# Patient Record
Sex: FEMALE | Race: WHITE | NOT HISPANIC OR LATINO | URBAN - METROPOLITAN AREA
[De-identification: names, ages, dates, MRNs, and addresses within clinical notes are randomized per-mention and may not be internally consistent; named-entity substitution may affect disease eponyms.]

---

## 2018-09-09 ENCOUNTER — INPATIENT (INPATIENT)
Facility: HOSPITAL | Age: 63
LOS: 1 days | Discharge: HOME | DRG: 881 | End: 2018-09-11
Attending: HOSPITALIST | Admitting: FAMILY MEDICINE
Payer: MEDICARE

## 2018-09-09 VITALS
RESPIRATION RATE: 16 BRPM | HEIGHT: 67 IN | SYSTOLIC BLOOD PRESSURE: 142 MMHG | WEIGHT: 169.98 LBS | HEART RATE: 60 BPM | OXYGEN SATURATION: 99 % | TEMPERATURE: 98 F | DIASTOLIC BLOOD PRESSURE: 78 MMHG

## 2018-09-09 DIAGNOSIS — Z29.9 ENCOUNTER FOR PROPHYLACTIC MEASURES, UNSPECIFIED: ICD-10-CM

## 2018-09-09 DIAGNOSIS — E78.5 HYPERLIPIDEMIA, UNSPECIFIED: ICD-10-CM

## 2018-09-09 DIAGNOSIS — F32.9 MAJOR DEPRESSIVE DISORDER, SINGLE EPISODE, UNSPECIFIED: ICD-10-CM

## 2018-09-09 DIAGNOSIS — I10 ESSENTIAL (PRIMARY) HYPERTENSION: ICD-10-CM

## 2018-09-09 DIAGNOSIS — R41.3 OTHER AMNESIA: ICD-10-CM

## 2018-09-09 DIAGNOSIS — Z95.5 PRESENCE OF CORONARY ANGIOPLASTY IMPLANT AND GRAFT: Chronic | ICD-10-CM

## 2018-09-09 DIAGNOSIS — F41.9 ANXIETY DISORDER, UNSPECIFIED: ICD-10-CM

## 2018-09-09 DIAGNOSIS — I21.9 ACUTE MYOCARDIAL INFARCTION, UNSPECIFIED: ICD-10-CM

## 2018-09-09 LAB
ALBUMIN SERPL ELPH-MCNC: 3.1 G/DL — LOW (ref 3.3–5)
ALP SERPL-CCNC: 89 U/L — SIGNIFICANT CHANGE UP (ref 40–120)
ALT FLD-CCNC: 21 U/L — SIGNIFICANT CHANGE UP (ref 12–78)
ANION GAP SERPL CALC-SCNC: 9 MMOL/L — SIGNIFICANT CHANGE UP (ref 5–17)
AST SERPL-CCNC: 19 U/L — SIGNIFICANT CHANGE UP (ref 15–37)
BASOPHILS # BLD AUTO: 0.03 K/UL — SIGNIFICANT CHANGE UP (ref 0–0.2)
BASOPHILS NFR BLD AUTO: 0.5 % — SIGNIFICANT CHANGE UP (ref 0–2)
BILIRUB SERPL-MCNC: 0.6 MG/DL — SIGNIFICANT CHANGE UP (ref 0.2–1.2)
BUN SERPL-MCNC: 13 MG/DL — SIGNIFICANT CHANGE UP (ref 7–23)
CALCIUM SERPL-MCNC: 8.4 MG/DL — LOW (ref 8.5–10.1)
CHLORIDE SERPL-SCNC: 106 MMOL/L — SIGNIFICANT CHANGE UP (ref 96–108)
CK MB BLD-MCNC: <1.5 % — SIGNIFICANT CHANGE UP (ref 0–3.5)
CK MB CFR SERPL CALC: <1 NG/ML — SIGNIFICANT CHANGE UP (ref 0–3.6)
CK SERPL-CCNC: 68 U/L — SIGNIFICANT CHANGE UP (ref 26–192)
CO2 SERPL-SCNC: 27 MMOL/L — SIGNIFICANT CHANGE UP (ref 22–31)
CREAT SERPL-MCNC: 0.79 MG/DL — SIGNIFICANT CHANGE UP (ref 0.5–1.3)
D DIMER BLD IA.RAPID-MCNC: <150 NG/ML DDU — SIGNIFICANT CHANGE UP
EOSINOPHIL # BLD AUTO: 0.08 K/UL — SIGNIFICANT CHANGE UP (ref 0–0.5)
EOSINOPHIL NFR BLD AUTO: 1.2 % — SIGNIFICANT CHANGE UP (ref 0–6)
GLUCOSE SERPL-MCNC: 95 MG/DL — SIGNIFICANT CHANGE UP (ref 70–99)
HCT VFR BLD CALC: 35.2 % — SIGNIFICANT CHANGE UP (ref 34.5–45)
HGB BLD-MCNC: 11.7 G/DL — SIGNIFICANT CHANGE UP (ref 11.5–15.5)
IMM GRANULOCYTES NFR BLD AUTO: 0.3 % — SIGNIFICANT CHANGE UP (ref 0–1.5)
INR BLD: 1.04 RATIO — SIGNIFICANT CHANGE UP (ref 0.88–1.16)
LYMPHOCYTES # BLD AUTO: 1.86 K/UL — SIGNIFICANT CHANGE UP (ref 1–3.3)
LYMPHOCYTES # BLD AUTO: 29 % — SIGNIFICANT CHANGE UP (ref 13–44)
MCHC RBC-ENTMCNC: 30.5 PG — SIGNIFICANT CHANGE UP (ref 27–34)
MCHC RBC-ENTMCNC: 33.2 GM/DL — SIGNIFICANT CHANGE UP (ref 32–36)
MCV RBC AUTO: 91.9 FL — SIGNIFICANT CHANGE UP (ref 80–100)
MONOCYTES # BLD AUTO: 0.41 K/UL — SIGNIFICANT CHANGE UP (ref 0–0.9)
MONOCYTES NFR BLD AUTO: 6.4 % — SIGNIFICANT CHANGE UP (ref 2–14)
NEUTROPHILS # BLD AUTO: 4.01 K/UL — SIGNIFICANT CHANGE UP (ref 1.8–7.4)
NEUTROPHILS NFR BLD AUTO: 62.6 % — SIGNIFICANT CHANGE UP (ref 43–77)
NRBC # BLD: 0 /100 WBCS — SIGNIFICANT CHANGE UP (ref 0–0)
PLATELET # BLD AUTO: 285 K/UL — SIGNIFICANT CHANGE UP (ref 150–400)
POTASSIUM SERPL-MCNC: 4 MMOL/L — SIGNIFICANT CHANGE UP (ref 3.5–5.3)
POTASSIUM SERPL-SCNC: 4 MMOL/L — SIGNIFICANT CHANGE UP (ref 3.5–5.3)
PROT SERPL-MCNC: 6.4 G/DL — SIGNIFICANT CHANGE UP (ref 6–8.3)
PROTHROM AB SERPL-ACNC: 11.4 SEC — SIGNIFICANT CHANGE UP (ref 9.8–12.7)
RBC # BLD: 3.83 M/UL — SIGNIFICANT CHANGE UP (ref 3.8–5.2)
RBC # FLD: 13.2 % — SIGNIFICANT CHANGE UP (ref 10.3–14.5)
SODIUM SERPL-SCNC: 142 MMOL/L — SIGNIFICANT CHANGE UP (ref 135–145)
TROPONIN I SERPL-MCNC: <.015 NG/ML — SIGNIFICANT CHANGE UP (ref 0.01–0.04)
TSH SERPL-MCNC: 2.12 UIU/ML — SIGNIFICANT CHANGE UP (ref 0.36–3.74)
WBC # BLD: 6.41 K/UL — SIGNIFICANT CHANGE UP (ref 3.8–10.5)
WBC # FLD AUTO: 6.41 K/UL — SIGNIFICANT CHANGE UP (ref 3.8–10.5)

## 2018-09-09 PROCEDURE — 93010 ELECTROCARDIOGRAM REPORT: CPT

## 2018-09-09 PROCEDURE — 99223 1ST HOSP IP/OBS HIGH 75: CPT | Mod: AI,GC

## 2018-09-09 PROCEDURE — 70450 CT HEAD/BRAIN W/O DYE: CPT | Mod: 26

## 2018-09-09 PROCEDURE — 71045 X-RAY EXAM CHEST 1 VIEW: CPT | Mod: 26

## 2018-09-09 PROCEDURE — 99285 EMERGENCY DEPT VISIT HI MDM: CPT

## 2018-09-09 RX ORDER — INFLUENZA VIRUS VACCINE 15; 15; 15; 15 UG/.5ML; UG/.5ML; UG/.5ML; UG/.5ML
0.5 SUSPENSION INTRAMUSCULAR ONCE
Qty: 0 | Refills: 0 | Status: DISCONTINUED | OUTPATIENT
Start: 2018-09-09 | End: 2018-09-11

## 2018-09-09 RX ORDER — PANTOPRAZOLE SODIUM 20 MG/1
40 TABLET, DELAYED RELEASE ORAL
Qty: 0 | Refills: 0 | Status: DISCONTINUED | OUTPATIENT
Start: 2018-09-09 | End: 2018-09-11

## 2018-09-09 RX ORDER — CLOPIDOGREL BISULFATE 75 MG/1
75 TABLET, FILM COATED ORAL DAILY
Qty: 0 | Refills: 0 | Status: DISCONTINUED | OUTPATIENT
Start: 2018-09-09 | End: 2018-09-11

## 2018-09-09 RX ORDER — FLUOXETINE HCL 10 MG
1 CAPSULE ORAL
Qty: 0 | Refills: 0 | COMMUNITY

## 2018-09-09 RX ORDER — ATENOLOL 25 MG/1
50 TABLET ORAL DAILY
Qty: 0 | Refills: 0 | Status: DISCONTINUED | OUTPATIENT
Start: 2018-09-09 | End: 2018-09-11

## 2018-09-09 RX ORDER — ASPIRIN/CALCIUM CARB/MAGNESIUM 324 MG
81 TABLET ORAL DAILY
Qty: 0 | Refills: 0 | Status: DISCONTINUED | OUTPATIENT
Start: 2018-09-09 | End: 2018-09-11

## 2018-09-09 RX ORDER — ATORVASTATIN CALCIUM 80 MG/1
1 TABLET, FILM COATED ORAL
Qty: 0 | Refills: 0 | COMMUNITY

## 2018-09-09 RX ORDER — ATORVASTATIN CALCIUM 80 MG/1
20 TABLET, FILM COATED ORAL AT BEDTIME
Qty: 0 | Refills: 0 | Status: DISCONTINUED | OUTPATIENT
Start: 2018-09-09 | End: 2018-09-11

## 2018-09-09 RX ORDER — FLUOXETINE HCL 10 MG
10 CAPSULE ORAL DAILY
Qty: 0 | Refills: 0 | Status: DISCONTINUED | OUTPATIENT
Start: 2018-09-09 | End: 2018-09-11

## 2018-09-09 RX ORDER — CLOPIDOGREL BISULFATE 75 MG/1
1 TABLET, FILM COATED ORAL
Qty: 0 | Refills: 0 | COMMUNITY

## 2018-09-09 RX ORDER — CLOPIDOGREL BISULFATE 75 MG/1
0 TABLET, FILM COATED ORAL
Qty: 0 | Refills: 0 | COMMUNITY

## 2018-09-09 RX ORDER — ASPIRIN/CALCIUM CARB/MAGNESIUM 324 MG
1 TABLET ORAL
Qty: 0 | Refills: 0 | COMMUNITY

## 2018-09-09 RX ORDER — ENOXAPARIN SODIUM 100 MG/ML
40 INJECTION SUBCUTANEOUS EVERY 24 HOURS
Qty: 0 | Refills: 0 | Status: DISCONTINUED | OUTPATIENT
Start: 2018-09-09 | End: 2018-09-11

## 2018-09-09 RX ORDER — PANTOPRAZOLE SODIUM 20 MG/1
1 TABLET, DELAYED RELEASE ORAL
Qty: 0 | Refills: 0 | COMMUNITY

## 2018-09-09 RX ORDER — ATENOLOL 25 MG/1
1 TABLET ORAL
Qty: 0 | Refills: 0 | COMMUNITY

## 2018-09-09 RX ORDER — LISINOPRIL 2.5 MG/1
20 TABLET ORAL DAILY
Qty: 0 | Refills: 0 | Status: DISCONTINUED | OUTPATIENT
Start: 2018-09-09 | End: 2018-09-11

## 2018-09-09 RX ORDER — LISINOPRIL 2.5 MG/1
1 TABLET ORAL
Qty: 0 | Refills: 0 | COMMUNITY

## 2018-09-09 RX ADMIN — ENOXAPARIN SODIUM 40 MILLIGRAM(S): 100 INJECTION SUBCUTANEOUS at 17:28

## 2018-09-09 RX ADMIN — ATORVASTATIN CALCIUM 20 MILLIGRAM(S): 80 TABLET, FILM COATED ORAL at 21:02

## 2018-09-09 NOTE — H&P ADULT - ASSESSMENT
64yo F with PMH of HTN, HLD, Depression, Anxiety, MI (s/p stents 2015 on dual antiplatelet therapy) presents to the ED with confusion admitted for evaluation of confusion and memory loss

## 2018-09-09 NOTE — H&P ADULT - HISTORY OF PRESENT ILLNESS
64yo F with PMH of HTN, HLD, Depression, Anxiety, MI (s/p stents 2015 on dual antiplatelet therapy) presents to the ED with confusion. Pt arrived to [place] for her daughters wedding 2 days prior and since then as per  she has been forgetful. Pt has been forgetting places, past events, confusing people name and asking to go "home" every day. At times pt has a blank stare in her eyes and is unable to carry out conversation. As per  pt has been increasingly depressed with decrease interest and appetite as well as decreased showering habits. Pt has been more depressed since  started a new job 2 months ago. Pt started prozac 1 month and 2 days ago, family is concerned about side effects that could be causing these symptoms. Of note pt has history of Alzheimer disease with early onset in her mother at age 50. Pt denies weakness, N/V/D, abd pain, weakness, numbness/tingling.     In the ED, /78, HR 60, RR 16, Temp 97.8, 99% on RA. Labs significant for Calcium 8.4, alb 3.1, CT head negative, CXR negative. EKG shows NSR HR 57

## 2018-09-09 NOTE — CONSULT NOTE ADULT - ASSESSMENT
forgetfulness with blank stares -- possible pseudodementia secondary to depression   check eeg  mri   check labs   continue home meds

## 2018-09-09 NOTE — H&P ADULT - NSHPREVIEWOFSYSTEMS_GEN_ALL_CORE
Constitutional: denies fever, chills, diaphoresis   HEENT: denies blurry vision, difficulty hearing  Respiratory: denies SOB, ALMAZAN, cough, sputum production, wheezing, hemoptysis  Cardiovascular: denies CP, palpitations, edema  Gastrointestinal: denies nausea, vomiting, diarrhea, constipation, abdominal pain, melena, hematochezia   Genitourinary: denies dysuria, frequency, urgency, hematuria   Skin/Breast: denies rash, itching  Musculoskeletal: denies myalgias, joint swelling, muscle weakness  Neurologic: denies headache, weakness, dizziness, paresthesias, numbness/tingling  Psychiatric: admits to anxiety and depression denies suicidal, homicidal thoughts

## 2018-09-09 NOTE — H&P ADULT - ATTENDING COMMENTS
Given acuity of symptoms I am suspicious patient is experiencing worsening depression, rather than dementia. Will monitor and get psych eval.

## 2018-09-09 NOTE — ED ADULT TRIAGE NOTE - CHIEF COMPLAINT QUOTE
Pt brought to ED by family for increased memory loss and moments of ams, family reports this has been going on for more than 1 week and has gotten worse

## 2018-09-09 NOTE — ED ADULT NURSE REASSESSMENT NOTE - NS ED NURSE REASSESS COMMENT FT1
patient seen by neuro admitted to medicine awaiting bed pt status unchanged family at bedside pt pleasantly confused easily reoriented

## 2018-09-09 NOTE — H&P ADULT - PROBLEM SELECTOR PLAN 3
continue prozac  consider Psych consult continue prozac  consider Psych consult as memory loss can be attributed to depression

## 2018-09-09 NOTE — H&P ADULT - NSHPSOCIALHISTORY_GEN_ALL_CORE
Pt lives at home with   rarely drinks etoh  former smoker 1ppd for 40 years, now smokes 2-3 cigs a day  able to carry out ADLs  ambulates independently

## 2018-09-09 NOTE — H&P ADULT - NSHPPHYSICALEXAM_GEN_ALL_CORE
T(C): 36.6 (09-09-18 @ 12:02), Max: 36.6 (09-09-18 @ 12:02)  HR: 60 (09-09-18 @ 12:02) (60 - 60)  BP: 142/78 (09-09-18 @ 12:02) (142/78 - 142/78)  RR: 16 (09-09-18 @ 12:02) (16 - 16)  SpO2: 99% (09-09-18 @ 12:02) (99% - 99%)  Wt(kg): --    Physical Exam:  General: NAD  HEENT: normocephalic, atraumatic, PERRLA, extraocular muscles intact bilaterally, moist mucous membranes   Neck: Supple, nontender, no mass  Neurology: A&Ox3, moves all extremities x4, nonfocal, CN II-XII grossly intact, sensation intact  Respiratory: clear to auscultation B/L, No wheezes, rales, ronchi  CV: RRR, +S1/S2, no murmurs, rubs or gallops  Abdominal: Soft, nontender, nondistended +BSx4  Extremities: No cyanosis/clubbing/edema, + peripheral pulses  MSK: Normal ROM, no joint erythema or warmth, no joint swelling   Skin: warm, dry, normal color, no rash or abnormal lesions T(C): 36.6 (09-09-18 @ 12:02), Max: 36.6 (09-09-18 @ 12:02)  HR: 60 (09-09-18 @ 12:02) (60 - 60)  BP: 142/78 (09-09-18 @ 12:02) (142/78 - 142/78)  RR: 16 (09-09-18 @ 12:02) (16 - 16)  SpO2: 99% (09-09-18 @ 12:02) (99% - 99%)  Wt(kg): --    Physical Exam:  General: NAD  HEENT: normocephalic, atraumatic, PERRLA, extraocular muscles intact bilaterally, moist mucous membranes   Neck: Supple, nontender, no mass  Neurology: A&Ox3, moves all extremities x4, nonfocal, CN II-XII grossly intact, sensation intact  Respiratory: clear to auscultation B/L, No wheezes, rales, rhonchi  CV: RRR, +S1/S2, no murmurs, rubs or gallops  Abdominal: Soft, nontender, nondistended +BSx4  Extremities: No cyanosis/clubbing/edema, + peripheral pulses  MSK: Normal ROM, no joint erythema or warmth, no joint swelling   Skin: warm, dry, normal color, no rash or abnormal lesions

## 2018-09-09 NOTE — ED PROVIDER NOTE - OBJECTIVE STATEMENT
63 female presents to ER with  and brother, arrived from [place] yesterday for daughter's wedding.  states for the past 2 days patient has been forgetful regarding places and past events, confusing people's names, at times has a "blank look in her eyes", cannot carry a meaninful conversation. Patient states she feels tired, denies chest pain, no fever, no vomiting.

## 2018-09-09 NOTE — PATIENT PROFILE ADULT. - VISION (WITH CORRECTIVE LENSES IF THE PATIENT USUALLY WEARS THEM):
Partially impaired: cannot see medication labels or newsprint, but can see obstacles in path, and the surrounding layout; can count fingers at arm's length/wears glasses  2 paira

## 2018-09-09 NOTE — H&P ADULT - PROBLEM SELECTOR PLAN 1
Admit to [facility]  Confusion and memory impairment with acute onset over past 2 days  Recent stressor 2 months ago, increasingly depressed with decreased interest  CT head negative  evaluate for TIA vs CVA, will order MRI  carotid dopplers  lipid panel  TFTs  Neuro consulted  EEG Admit to [facility]  Confusion and memory impairment with acute onset over past 2 days  Recent stressor 2 months ago, increasingly depressed with decreased interest  CT head negative  evaluate for TIA vs CVA, will order MRI  lipid panel  TFTs  Neuro consulted  EEG

## 2018-09-09 NOTE — ED ADULT NURSE NOTE - CHPI ED NUR SYMPTOMS NEG
no dizziness/no vomiting/no fever/no blurred vision/no change in level of consciousness/no weakness/no nausea/no numbness/no loss of consciousness

## 2018-09-09 NOTE — ED ADULT NURSE NOTE - OBJECTIVE STATEMENT
patient comes to ED brought by  pt is visiting from [place] and according to pt  and family she has been losing her memory forgetting things worsening over past month or two patient is awake alert oriented to person place and situation denies head trauma denies headache patient is pleasantly confused and forgetful pt moves all extremities well ambulates without difficulty has also had a loss of appetite

## 2018-09-09 NOTE — PROVIDER CONTACT NOTE (OTHER) - SITUATION
Patient complaining of pain on her RLE, knee down.Per patient something new for her. No other complaints voiced.

## 2018-09-10 DIAGNOSIS — F43.21 ADJUSTMENT DISORDER WITH DEPRESSED MOOD: ICD-10-CM

## 2018-09-10 LAB
ALBUMIN SERPL ELPH-MCNC: 2.8 G/DL — LOW (ref 3.3–5)
ALP SERPL-CCNC: 76 U/L — SIGNIFICANT CHANGE UP (ref 40–120)
ALT FLD-CCNC: 17 U/L — SIGNIFICANT CHANGE UP (ref 12–78)
ANION GAP SERPL CALC-SCNC: 9 MMOL/L — SIGNIFICANT CHANGE UP (ref 5–17)
APPEARANCE UR: CLEAR — SIGNIFICANT CHANGE UP
AST SERPL-CCNC: 16 U/L — SIGNIFICANT CHANGE UP (ref 15–37)
BASOPHILS # BLD AUTO: 0.04 K/UL — SIGNIFICANT CHANGE UP (ref 0–0.2)
BASOPHILS NFR BLD AUTO: 0.7 % — SIGNIFICANT CHANGE UP (ref 0–2)
BILIRUB SERPL-MCNC: 0.8 MG/DL — SIGNIFICANT CHANGE UP (ref 0.2–1.2)
BILIRUB UR-MCNC: NEGATIVE — SIGNIFICANT CHANGE UP
BUN SERPL-MCNC: 11 MG/DL — SIGNIFICANT CHANGE UP (ref 7–23)
CALCIUM SERPL-MCNC: 8.6 MG/DL — SIGNIFICANT CHANGE UP (ref 8.5–10.1)
CHLORIDE SERPL-SCNC: 108 MMOL/L — SIGNIFICANT CHANGE UP (ref 96–108)
CHOLEST SERPL-MCNC: 151 MG/DL — SIGNIFICANT CHANGE UP (ref 10–199)
CO2 SERPL-SCNC: 26 MMOL/L — SIGNIFICANT CHANGE UP (ref 22–31)
COLOR SPEC: YELLOW — SIGNIFICANT CHANGE UP
CREAT SERPL-MCNC: 0.81 MG/DL — SIGNIFICANT CHANGE UP (ref 0.5–1.3)
DIFF PNL FLD: NEGATIVE — SIGNIFICANT CHANGE UP
EOSINOPHIL # BLD AUTO: 0.13 K/UL — SIGNIFICANT CHANGE UP (ref 0–0.5)
EOSINOPHIL NFR BLD AUTO: 2.2 % — SIGNIFICANT CHANGE UP (ref 0–6)
FOLATE SERPL-MCNC: 7 NG/ML — SIGNIFICANT CHANGE UP
GLUCOSE SERPL-MCNC: 88 MG/DL — SIGNIFICANT CHANGE UP (ref 70–99)
GLUCOSE UR QL: NEGATIVE — SIGNIFICANT CHANGE UP
HCT VFR BLD CALC: 32.7 % — LOW (ref 34.5–45)
HDLC SERPL-MCNC: 47 MG/DL — LOW
HGB BLD-MCNC: 11.1 G/DL — LOW (ref 11.5–15.5)
IMM GRANULOCYTES NFR BLD AUTO: 0.3 % — SIGNIFICANT CHANGE UP (ref 0–1.5)
KETONES UR-MCNC: NEGATIVE — SIGNIFICANT CHANGE UP
LEUKOCYTE ESTERASE UR-ACNC: NEGATIVE — SIGNIFICANT CHANGE UP
LIPID PNL WITH DIRECT LDL SERPL: 82 MG/DL — SIGNIFICANT CHANGE UP
LYMPHOCYTES # BLD AUTO: 1.92 K/UL — SIGNIFICANT CHANGE UP (ref 1–3.3)
LYMPHOCYTES # BLD AUTO: 31.8 % — SIGNIFICANT CHANGE UP (ref 13–44)
MCHC RBC-ENTMCNC: 30.7 PG — SIGNIFICANT CHANGE UP (ref 27–34)
MCHC RBC-ENTMCNC: 33.9 GM/DL — SIGNIFICANT CHANGE UP (ref 32–36)
MCV RBC AUTO: 90.3 FL — SIGNIFICANT CHANGE UP (ref 80–100)
MONOCYTES # BLD AUTO: 0.39 K/UL — SIGNIFICANT CHANGE UP (ref 0–0.9)
MONOCYTES NFR BLD AUTO: 6.5 % — SIGNIFICANT CHANGE UP (ref 2–14)
NEUTROPHILS # BLD AUTO: 3.53 K/UL — SIGNIFICANT CHANGE UP (ref 1.8–7.4)
NEUTROPHILS NFR BLD AUTO: 58.5 % — SIGNIFICANT CHANGE UP (ref 43–77)
NITRITE UR-MCNC: NEGATIVE — SIGNIFICANT CHANGE UP
PH UR: 5 — SIGNIFICANT CHANGE UP (ref 5–8)
PLATELET # BLD AUTO: 278 K/UL — SIGNIFICANT CHANGE UP (ref 150–400)
POTASSIUM SERPL-MCNC: 4 MMOL/L — SIGNIFICANT CHANGE UP (ref 3.5–5.3)
POTASSIUM SERPL-SCNC: 4 MMOL/L — SIGNIFICANT CHANGE UP (ref 3.5–5.3)
PROT SERPL-MCNC: 5.9 G/DL — LOW (ref 6–8.3)
PROT UR-MCNC: NEGATIVE — SIGNIFICANT CHANGE UP
RBC # BLD: 3.62 M/UL — LOW (ref 3.8–5.2)
RBC # FLD: 12.8 % — SIGNIFICANT CHANGE UP (ref 10.3–14.5)
SODIUM SERPL-SCNC: 143 MMOL/L — SIGNIFICANT CHANGE UP (ref 135–145)
SP GR SPEC: 1.01 — SIGNIFICANT CHANGE UP (ref 1.01–1.02)
T3 SERPL-MCNC: 128 NG/DL — SIGNIFICANT CHANGE UP (ref 80–200)
T4 AB SER-ACNC: 5.5 UG/DL — SIGNIFICANT CHANGE UP (ref 4.6–12)
TOTAL CHOLESTEROL/HDL RATIO MEASUREMENT: 3.2 RATIO — LOW (ref 3.3–7.1)
TRIGL SERPL-MCNC: 110 MG/DL — SIGNIFICANT CHANGE UP (ref 10–149)
UROBILINOGEN FLD QL: NEGATIVE — SIGNIFICANT CHANGE UP
VIT B12 SERPL-MCNC: 270 PG/ML — SIGNIFICANT CHANGE UP (ref 232–1245)
WBC # BLD: 6.03 K/UL — SIGNIFICANT CHANGE UP (ref 3.8–10.5)
WBC # FLD AUTO: 6.03 K/UL — SIGNIFICANT CHANGE UP (ref 3.8–10.5)

## 2018-09-10 PROCEDURE — 70553 MRI BRAIN STEM W/O & W/DYE: CPT | Mod: 26

## 2018-09-10 PROCEDURE — 99233 SBSQ HOSP IP/OBS HIGH 50: CPT

## 2018-09-10 PROCEDURE — 90792 PSYCH DIAG EVAL W/MED SRVCS: CPT

## 2018-09-10 RX ORDER — MIRTAZAPINE 45 MG/1
7.5 TABLET, ORALLY DISINTEGRATING ORAL AT BEDTIME
Qty: 0 | Refills: 0 | Status: DISCONTINUED | OUTPATIENT
Start: 2018-09-10 | End: 2018-09-11

## 2018-09-10 RX ADMIN — ENOXAPARIN SODIUM 40 MILLIGRAM(S): 100 INJECTION SUBCUTANEOUS at 17:23

## 2018-09-10 RX ADMIN — Medication 10 MILLIGRAM(S): at 11:16

## 2018-09-10 RX ADMIN — ATORVASTATIN CALCIUM 20 MILLIGRAM(S): 80 TABLET, FILM COATED ORAL at 22:16

## 2018-09-10 RX ADMIN — MIRTAZAPINE 7.5 MILLIGRAM(S): 45 TABLET, ORALLY DISINTEGRATING ORAL at 22:16

## 2018-09-10 RX ADMIN — CLOPIDOGREL BISULFATE 75 MILLIGRAM(S): 75 TABLET, FILM COATED ORAL at 11:16

## 2018-09-10 RX ADMIN — LISINOPRIL 20 MILLIGRAM(S): 2.5 TABLET ORAL at 05:20

## 2018-09-10 RX ADMIN — PANTOPRAZOLE SODIUM 40 MILLIGRAM(S): 20 TABLET, DELAYED RELEASE ORAL at 05:20

## 2018-09-10 RX ADMIN — ATENOLOL 50 MILLIGRAM(S): 25 TABLET ORAL at 05:20

## 2018-09-10 RX ADMIN — Medication 81 MILLIGRAM(S): at 11:16

## 2018-09-10 NOTE — CHART NOTE - NSCHARTNOTEFT_GEN_A_CORE
Called by RN for Pt c/o right leg pain.  Patient seen and examined at bedside. Currently patient does not feel the pain but wanted make sure this was included in her ROS. Patient states she had right lower extremity pain for past 4 months, Intermittently feels like stabbing pain and when occurs prevents patient from walking more than 1 block. Worsens at night and lasts for 30 minutes. Rest allows the pain to subside when walking. . Denies trauma, rashes, fevers, chills, CP, SOB, palpitations, N/V.        T(C): 36.6 (09-09-18 @ 19:48), Max: 36.6 (09-09-18 @ 12:02)  HR: 63 (09-09-18 @ 19:48) (60 - 65)  BP: 124/69 (09-09-18 @ 19:48) (124/69 - 142/78)  RR: 16 (09-09-18 @ 19:48) (15 - 16)  SpO2: 97% (09-09-18 @ 19:48) (96% - 99%)  Wt(kg): --    Physical :  Gen- NAD, ncat  Ext- bilaterally no edema, no cyanosis, no erythema, no rash, no calf tenderness, Mariluz's sign neg, 2+ pulses b/l  Neuro-  strength 5/5 b/l extrem, ROM intact      Assessment/Plan  63yFemale admitted for   1. Chronic RLE intermittent pain likely claudication with patient's smoking  and history. No further workup at this time. Follow up with primary care.

## 2018-09-10 NOTE — DISCHARGE NOTE ADULT - MEDICATION SUMMARY - MEDICATIONS TO STOP TAKING
I will STOP taking the medications listed below when I get home from the hospital:    Norco 5 mg-325 mg oral tablet  -- 1 tab(s) by mouth 3 times a day, As Needed

## 2018-09-10 NOTE — PROGRESS NOTE ADULT - ATTENDING COMMENTS
F/u MRI, EEG  Psych Dr. Crooks called   PT evaluation F/u MRI, EEG  Psych Dr. Crooks called   PT evaluation  D/w Patient

## 2018-09-10 NOTE — DISCHARGE NOTE ADULT - VISION (WITH CORRECTIVE LENSES IF THE PATIENT USUALLY WEARS THEM):
wears glasses  2 paira/Partially impaired: cannot see medication labels or newsprint, but can see obstacles in path, and the surrounding layout; can count fingers at arm's length

## 2018-09-10 NOTE — PROGRESS NOTE ADULT - PROBLEM SELECTOR PLAN 1
Confusion and memory impairment with acute onset over past 2 days  Recent stressor 2 months ago, increasingly depressed with decreased interest  CT head negative  evaluate for TIA vs CVA, will order MRI  lipid panel  TFTs  Neuro consulted  EEG

## 2018-09-10 NOTE — DISCHARGE NOTE ADULT - HOSPITAL COURSE
62yo F with PMH of HTN, HLD, Depression, Anxiety, MI (s/p stents 2015 on dual antiplatelet therapy) presents to the ED with confusion admitted for evaluation of confusion and memory loss suspect secondary to pseudodementia, depression. CVA ruled out. Patient was seen by neuro, Psych. Work up negative 64yo F with PMH of HTN, HLD, Depression, Anxiety, MI (s/p stents 2015 on dual antiplatelet therapy) presents to the ED with confusion admitted for evaluation of confusion and memory loss suspect secondary to pseudodementia, depression. CVA ruled out. Patient was seen by neuro, Psych. Work up negative  including CT head, MRI with and without contrast, , EEG. Patient cleared for discharge and out patient f/u with neurologist ans Psych. Patient lives in [place] and will go back. Plan d/w , brother.

## 2018-09-10 NOTE — DISCHARGE NOTE ADULT - CARE PLAN
Principal Discharge DX:	Memory loss or impairment  Goal:	Prevent recurrence  Assessment and plan of treatment:	Suspect secondary to pseudodementia and depression   Back to baseline mental status  Please f/u with a Neurologist and Psych when you go back to [place]  Secondary Diagnosis:	Depression  Assessment and plan of treatment:	Continue Prozac  Added Remeron by Dr. Crooks , will send prescription to pharmacy  Secondary Diagnosis:	HLD (hyperlipidemia)  Assessment and plan of treatment:	Home Med  f/u with PCP  Secondary Diagnosis:	HTN (hypertension)  Assessment and plan of treatment:	Home Med  F/u with PCP  Secondary Diagnosis:	CAD (coronary artery disease)  Assessment and plan of treatment:	Continue home meds  F/u with your cardiologist

## 2018-09-10 NOTE — DISCHARGE NOTE ADULT - PATIENT PORTAL LINK FT
You can access the [facility], offered by [facility], by registering with the following website: [email or web address]

## 2018-09-10 NOTE — PROGRESS NOTE ADULT - SUBJECTIVE AND OBJECTIVE BOX
Neurology follow up note    SHRAVAN PARIKH63yFemale      Interval History:    Patient feels ok no new complaints seen with spouse     MEDICATIONS    aspirin enteric coated 81 milliGRAM(s) Oral daily  ATENolol  Tablet 50 milliGRAM(s) Oral daily  atorvastatin 20 milliGRAM(s) Oral at bedtime  clopidogrel Tablet 75 milliGRAM(s) Oral daily  enoxaparin Injectable 40 milliGRAM(s) SubCutaneous every 24 hours  FLUoxetine 10 milliGRAM(s) Oral daily  influenza   Vaccine 0.5 milliLiter(s) IntraMuscular once  lisinopril 20 milliGRAM(s) Oral daily  pantoprazole    Tablet 40 milliGRAM(s) Oral before breakfast      Allergies    No Known Allergies    Intolerances        Height (cm): 170.18 ( @ 12:02)  Weight (kg): 77.1 ( @ 12:02)  BMI (kg/m2): 26.6 ( @ 12:02)    Vital Signs Last 24 Hrs  T(C): 36.7 (10 Sep 2018 04:57), Max: 36.7 (10 Sep 2018 04:57)  T(F): 98 (10 Sep 2018 04:57), Max: 98 (10 Sep 2018 04:57)  HR: 69 (10 Sep 2018 04:57) (60 - 69)  BP: 120/74 (10 Sep 2018 04:57) (120/74 - 142/78)  BP(mean): --  RR: 16 (10 Sep 2018 04:57) (15 - 16)  SpO2: 94% (10 Sep 2018 04:57) (94% - 99%)      REVIEW OF SYSTEMS:     Constitutional: No fever, chills, fatigue, weakness  Eyes: no eye pain, visual disturbances, or discharge  ENT:  No difficulty hearing, tinnitus, vertigo; No sinus or throat pain  Neck: No pain or stiffness  Respiratory: No cough, dyspnea, wheezing   Cardiovascular: No chest pain, palpitations,   Gastrointestinal: No abdominal or epigastric pain. No nausea, vomiting  No diarrhea or constipation.   Genitourinary: No dysuria, frequency, hematuria or incontinence  Neurological: No headaches, lightheadedness, vertigo, numbness or tremors  Psychiatric: No depression, anxiety, mood swings or difficulty sleeping  Musculoskeletal: No joint pain or swelling; No muscle, back or extremity pain  Skin: No itching, burning, rashes or lesions   Lymph Nodes: No enlarged glands  Endocrine: No heat or cold intolerance; No hair loss   Allergy and Immunologic: No hives or eczema    On Neurological Examination:      The patient awake, alert.  Location [facility], year , month was September.  Five nickels in a quarter.  Dog backward spells god.  Two +2 was 4, +4 was 8, +8 was 16, +16 was 32.  She was able to name her children, was able to name simple objects, was able to follow simple complex commands, took right hand touch left ear.  Recall was 0/3 within 3 minutes without prompting.  Then, with prompting was 1/3.      Extraocular movements were intact.  Full visual fields.  Pupils equal, round, and reactive bilaterally 3 mm to 2.      peech was fluent.  Smile symmetric.      Motor, bilateral upper and lower 5/5.  Sensory, bilateral upper and lower intact to light touch.      No drift.  Finger to nose within normal limits.    Follow simple commands  Follow complex commands    GENERAL Exam: Nontoxic , No Acute Distress   	  HEENT:  normocephalic, atraumatic  		  LUNGS: Clear bilaterally    	  HEART: Normal S1S2   No murmur RRR        	  GI/ ABDOMEN:  Soft  Non tender    EXTREMITIES:   No Edema  No Clubbing  No Cyanosis     MUSCULOSKELETAL: Normal Range of Motion   	   SKIN: Normal  No Ecchymosis               LABS:  CBC Full  -  ( 10 Sep 2018 07:15 )  WBC Count : 6.03 K/uL  Hemoglobin : 11.1 g/dL  Hematocrit : 32.7 %  Platelet Count - Automated : 278 K/uL  Mean Cell Volume : 90.3 fl  Mean Cell Hemoglobin : 30.7 pg  Mean Cell Hemoglobin Concentration : 33.9 gm/dL  Auto Neutrophil # : 3.53 K/uL  Auto Lymphocyte # : 1.92 K/uL  Auto Monocyte # : 0.39 K/uL  Auto Eosinophil # : 0.13 K/uL  Auto Basophil # : 0.04 K/uL  Auto Neutrophil % : 58.5 %  Auto Lymphocyte % : 31.8 %  Auto Monocyte % : 6.5 %  Auto Eosinophil % : 2.2 %  Auto Basophil % : 0.7 %    Urinalysis Basic - ( 10 Sep 2018 06:03 )    Color: Yellow / Appearance: Clear / S.010 / pH: x  Gluc: x / Ketone: Negative  / Bili: Negative / Urobili: Negative   Blood: x / Protein: Negative / Nitrite: Negative   Leuk Esterase: Negative / RBC: x / WBC x   Sq Epi: x / Non Sq Epi: x / Bacteria: x      09-10    143  |  108  |  11  ----------------------------<  88  4.0   |  26  |  0.81    Ca    8.6      10 Sep 2018 07:15    TPro  5.9<L>  /  Alb  2.8<L>  /  TBili  0.8  /  DBili  x   /  AST  16  /  ALT  17  /  AlkPhos  76  10    Hemoglobin A1C:   Lipid Panel  @ 23:59  Total Cholesterol, Serum 151  LDL 82  Triglycerides 110    LIVER FUNCTIONS - ( 10 Sep 2018 07:15 )  Alb: 2.8 g/dL / Pro: 5.9 g/dL / ALK PHOS: 76 U/L / ALT: 17 U/L / AST: 16 U/L / GGT: x           Vitamin B12 Vitamin B12, Serum: 270 pg/mL ( @ 23:59)    PT/INR - ( 09 Sep 2018 13:31 )   PT: 11.4 sec;   INR: 1.04 ratio               RADIOLOGY      ANALYSIS AND PLAN:  This is a 63-year with episodes of forgetfulness.  1.	For episode of forgetfulness with occasional blank stare, questionable this could be any type of pseudodementia secondary to depression.  It appeared roughly 4 months ago when spouse started a new job, she started to become fatigued, lethargic, lazy, not eating, poor oral intake, possibly a pseudodementia secondary to depression.  Other differentials could be possibly subclinical seizure events versus possibly underlying migraine events.    2.	The patient is already on aspirin, Plavix, and a statin.    3.	would plan for MRI imaging of the brain,   4.	I spoke with the patient and spouse in great detail that she should undergo further testing in regard to rule out subclinical seizure events, EEG, unable to do today no technician   5.	 It is unclear if they want to remain in the hospital.  If they do wish to leave, then the patient needs to follow up with her outside doctors to undergo EEG they understand this.  Based on their desire and willingness to stay in the hospital, we will plan the above mentioned tests, if not, the patient will follow up with her outside doctors.  If any new event, then definitely come back to the emergency room.  6.	spoke to spouse at bedside today patient is doing better overall today     Thank you for the courtesy of consultation.    Physical therapy evaluation as tolerated  OOB to chair/ambulation with assistance only if possible.    Greater than 45 minutes spent in direct patient care reviewing  the notes, lab data/ imaging , discussion with multidisciplinary team.

## 2018-09-10 NOTE — BEHAVIORAL HEALTH ASSESSMENT NOTE - HPI (INCLUDE ILLNESS QUALITY, SEVERITY, DURATION, TIMING, CONTEXT, MODIFYING FACTORS, ASSOCIATED SIGNS AND SYMPTOMS)
· HPI Objective Statement: 63 female presents to ER with  and brother, arrived from [place] yesterday for daughter's wedding.  states for the past 2 days patient has been forgetful regarding places and past events, confusing people's names, at times has a "blank look in her eyes", cannot carry a meaningful conversation. Patient states she feels tired, denies chest pain, no fever, no vomiting. Patient's  reports that the patient was doing well until 4 months ago when he got a new job, and she tended to spend a lot of time alone at home. Patient started complaining of fatigue and she reportedly tended to sleep during the day. Currently calm and cooperative, reports worsened mood, and worsened sleep, but no other vegetative symptoms of depression are elicited. Patient does appear to have difficulty recalling recent events, however, and does not recall circumstances that led to her hospitalization.

## 2018-09-10 NOTE — BEHAVIORAL HEALTH ASSESSMENT NOTE - NS ED [facility] MED ROS NEUROLOGICAL
Patient went to the [facility] for a rash yesterday. They were told to f/u with Dr. Danielito Mata and give patient tiana. Dad has some questions about how long to give meds for when to follow up etc. Please call back. No complaints

## 2018-09-10 NOTE — DISCHARGE NOTE ADULT - PLAN OF CARE
Prevent recurrence Suspect secondary to pseudodementia and depression   Back to baseline mental status  Please f/u with a Neurologist and Psych when you go back to [place] Continue Prozac  Added Remeron by Dr. Crooks , will send prescription to pharmacy Home Med  f/u with PCP Home Med  F/u with PCP Continue home meds  F/u with your cardiologist

## 2018-09-10 NOTE — DISCHARGE NOTE ADULT - MEDICATION SUMMARY - MEDICATIONS TO TAKE
I will START or STAY ON the medications listed below when I get home from the hospital:    Ecotrin Adult Low Strength 81 mg oral delayed release tablet  -- 1 tab(s) by mouth once a day  -- Indication: For CAD    lisinopril 20 mg oral tablet  -- 1 tab(s) by mouth once a day  -- Indication: For HTN (hypertension)    FLUoxetine 10 mg oral tablet  -- 1 tab(s) by mouth once a day  -- Indication: For Depression    mirtazapine 7.5 mg oral tablet  -- 1 tab(s) by mouth once a day (at bedtime)  -- Indication: For Depression    atorvastatin 20 mg oral tablet  -- 1 tab(s) by mouth once a day  -- Indication: For Home Med    Plavix 75 mg oral tablet  -- 1 tab(s) by mouth once a day  -- Indication: For CAD    atenolol 50 mg oral tablet  -- 1 tab(s) by mouth once a day  -- Indication: For HTN (hypertension)    pantoprazole 40 mg oral delayed release tablet  -- 1 tab(s) by mouth once a day  -- Indication: For Home Med

## 2018-09-11 VITALS
OXYGEN SATURATION: 96 % | SYSTOLIC BLOOD PRESSURE: 116 MMHG | DIASTOLIC BLOOD PRESSURE: 79 MMHG | TEMPERATURE: 98 F | HEART RATE: 55 BPM | RESPIRATION RATE: 17 BRPM

## 2018-09-11 LAB
ANION GAP SERPL CALC-SCNC: 7 MMOL/L — SIGNIFICANT CHANGE UP (ref 5–17)
BUN SERPL-MCNC: 11 MG/DL — SIGNIFICANT CHANGE UP (ref 7–23)
CALCIUM SERPL-MCNC: 8.6 MG/DL — SIGNIFICANT CHANGE UP (ref 8.5–10.1)
CHLORIDE SERPL-SCNC: 108 MMOL/L — SIGNIFICANT CHANGE UP (ref 96–108)
CO2 SERPL-SCNC: 29 MMOL/L — SIGNIFICANT CHANGE UP (ref 22–31)
CREAT SERPL-MCNC: 0.82 MG/DL — SIGNIFICANT CHANGE UP (ref 0.5–1.3)
GLUCOSE SERPL-MCNC: 85 MG/DL — SIGNIFICANT CHANGE UP (ref 70–99)
HCT VFR BLD CALC: 34.4 % — LOW (ref 34.5–45)
HGB BLD-MCNC: 11.5 G/DL — SIGNIFICANT CHANGE UP (ref 11.5–15.5)
MCHC RBC-ENTMCNC: 30.3 PG — SIGNIFICANT CHANGE UP (ref 27–34)
MCHC RBC-ENTMCNC: 33.4 GM/DL — SIGNIFICANT CHANGE UP (ref 32–36)
MCV RBC AUTO: 90.5 FL — SIGNIFICANT CHANGE UP (ref 80–100)
NRBC # BLD: 0 /100 WBCS — SIGNIFICANT CHANGE UP (ref 0–0)
PLATELET # BLD AUTO: 295 K/UL — SIGNIFICANT CHANGE UP (ref 150–400)
POTASSIUM SERPL-MCNC: 4.1 MMOL/L — SIGNIFICANT CHANGE UP (ref 3.5–5.3)
POTASSIUM SERPL-SCNC: 4.1 MMOL/L — SIGNIFICANT CHANGE UP (ref 3.5–5.3)
RBC # BLD: 3.8 M/UL — SIGNIFICANT CHANGE UP (ref 3.8–5.2)
RBC # FLD: 12.8 % — SIGNIFICANT CHANGE UP (ref 10.3–14.5)
SODIUM SERPL-SCNC: 144 MMOL/L — SIGNIFICANT CHANGE UP (ref 135–145)
WBC # BLD: 5.95 K/UL — SIGNIFICANT CHANGE UP (ref 3.8–10.5)
WBC # FLD AUTO: 5.95 K/UL — SIGNIFICANT CHANGE UP (ref 3.8–10.5)

## 2018-09-11 PROCEDURE — 71045 X-RAY EXAM CHEST 1 VIEW: CPT

## 2018-09-11 PROCEDURE — 85027 COMPLETE CBC AUTOMATED: CPT

## 2018-09-11 PROCEDURE — 84480 ASSAY TRIIODOTHYRONINE (T3): CPT

## 2018-09-11 PROCEDURE — 85610 PROTHROMBIN TIME: CPT

## 2018-09-11 PROCEDURE — 80061 LIPID PANEL: CPT

## 2018-09-11 PROCEDURE — 82550 ASSAY OF CK (CPK): CPT

## 2018-09-11 PROCEDURE — A9579: CPT

## 2018-09-11 PROCEDURE — 80053 COMPREHEN METABOLIC PANEL: CPT

## 2018-09-11 PROCEDURE — 84484 ASSAY OF TROPONIN QUANT: CPT

## 2018-09-11 PROCEDURE — 81003 URINALYSIS AUTO W/O SCOPE: CPT

## 2018-09-11 PROCEDURE — 93005 ELECTROCARDIOGRAM TRACING: CPT

## 2018-09-11 PROCEDURE — 96372 THER/PROPH/DIAG INJ SC/IM: CPT

## 2018-09-11 PROCEDURE — 70553 MRI BRAIN STEM W/O & W/DYE: CPT

## 2018-09-11 PROCEDURE — 36415 COLL VENOUS BLD VENIPUNCTURE: CPT

## 2018-09-11 PROCEDURE — 84443 ASSAY THYROID STIM HORMONE: CPT

## 2018-09-11 PROCEDURE — 99239 HOSP IP/OBS DSCHRG MGMT >30: CPT

## 2018-09-11 PROCEDURE — 82553 CREATINE MB FRACTION: CPT

## 2018-09-11 PROCEDURE — 82607 VITAMIN B-12: CPT

## 2018-09-11 PROCEDURE — 99285 EMERGENCY DEPT VISIT HI MDM: CPT | Mod: 25

## 2018-09-11 PROCEDURE — 80048 BASIC METABOLIC PNL TOTAL CA: CPT

## 2018-09-11 PROCEDURE — 70450 CT HEAD/BRAIN W/O DYE: CPT

## 2018-09-11 PROCEDURE — 95816 EEG AWAKE AND DROWSY: CPT

## 2018-09-11 PROCEDURE — 85379 FIBRIN DEGRADATION QUANT: CPT

## 2018-09-11 PROCEDURE — 84436 ASSAY OF TOTAL THYROXINE: CPT

## 2018-09-11 PROCEDURE — 82746 ASSAY OF FOLIC ACID SERUM: CPT

## 2018-09-11 RX ORDER — MIRTAZAPINE 45 MG/1
1 TABLET, ORALLY DISINTEGRATING ORAL
Qty: 10 | Refills: 0 | OUTPATIENT
Start: 2018-09-11 | End: 2018-09-20

## 2018-09-11 RX ORDER — MIRTAZAPINE 45 MG/1
1 TABLET, ORALLY DISINTEGRATING ORAL
Qty: 0 | Refills: 0 | COMMUNITY
Start: 2018-09-11

## 2018-09-11 RX ADMIN — LISINOPRIL 20 MILLIGRAM(S): 2.5 TABLET ORAL at 06:11

## 2018-09-11 RX ADMIN — CLOPIDOGREL BISULFATE 75 MILLIGRAM(S): 75 TABLET, FILM COATED ORAL at 12:05

## 2018-09-11 RX ADMIN — Medication 10 MILLIGRAM(S): at 12:07

## 2018-09-11 RX ADMIN — PANTOPRAZOLE SODIUM 40 MILLIGRAM(S): 20 TABLET, DELAYED RELEASE ORAL at 06:11

## 2018-09-11 RX ADMIN — Medication 81 MILLIGRAM(S): at 12:05

## 2018-09-11 RX ADMIN — ATENOLOL 50 MILLIGRAM(S): 25 TABLET ORAL at 06:11

## 2018-09-11 NOTE — PROGRESS NOTE ADULT - SUBJECTIVE AND OBJECTIVE BOX
Neurology follow up note    SHRAVAN MANyFemale      Interval History:    Patient feels ok no new complaints seen with family    MEDICATIONS    aspirin enteric coated 81 milliGRAM(s) Oral daily  ATENolol  Tablet 50 milliGRAM(s) Oral daily  atorvastatin 20 milliGRAM(s) Oral at bedtime  clopidogrel Tablet 75 milliGRAM(s) Oral daily  enoxaparin Injectable 40 milliGRAM(s) SubCutaneous every 24 hours  FLUoxetine 10 milliGRAM(s) Oral daily  influenza   Vaccine 0.5 milliLiter(s) IntraMuscular once  lisinopril 20 milliGRAM(s) Oral daily  mirtazapine 7.5 milliGRAM(s) Oral at bedtime  pantoprazole    Tablet 40 milliGRAM(s) Oral before breakfast      Allergies    No Known Allergies    Intolerances            Vital Signs Last 24 Hrs  T(C): 36.8 (11 Sep 2018 04:55), Max: 36.8 (11 Sep 2018 04:55)  T(F): 98.2 (11 Sep 2018 04:55), Max: 98.2 (11 Sep 2018 04:55)  HR: 52 (11 Sep 2018 04:55) (52 - 57)  BP: 129/82 (11 Sep 2018 04:55) (116/70 - 137/59)  BP(mean): --  RR: 17 (11 Sep 2018 04:55) (16 - 17)  SpO2: 96% (11 Sep 2018 04:55) (96% - 96%)    REVIEW OF SYSTEMS:     Constitutional: No fever, chills, fatigue, weakness  Eyes: no eye pain, visual disturbances, or discharge  ENT:  No difficulty hearing, tinnitus, vertigo; No sinus or throat pain  Neck: No pain or stiffness  Respiratory: No cough, dyspnea, wheezing   Cardiovascular: No chest pain, palpitations,   Gastrointestinal: No abdominal or epigastric pain. No nausea, vomiting  No diarrhea or constipation.   Genitourinary: No dysuria, frequency, hematuria or incontinence  Neurological: No headaches, lightheadedness, vertigo, numbness or tremors  Psychiatric: No depression, anxiety, mood swings or difficulty sleeping  Musculoskeletal: No joint pain or swelling; No muscle, back or extremity pain  Skin: No itching, burning, rashes or lesions   Lymph Nodes: No enlarged glands  Endocrine: No heat or cold intolerance; No hair loss   Allergy and Immunologic: No hives or eczema    On Neurological Examination:      The patient awake, alert.       Extraocular movements were intact.  Full visual fields.  Pupils equal, round, and reactive bilaterally 3 mm to 2.      peech was fluent.  Smile symmetric.      Motor, bilateral upper and lower 5/5.  Sensory, bilateral upper and lower intact to light touch.      No drift.  Finger to nose within normal limits.    Follow simple commands  Follow complex commands    GENERAL Exam: Nontoxic , No Acute Distress   	  HEENT:  normocephalic, atraumatic  		  LUNGS: Clear bilaterally    	  HEART: Normal S1S2   No murmur RRR        	  GI/ ABDOMEN:  Soft  Non tender    EXTREMITIES:   No Edema  No Clubbing  No Cyanosis     MUSCULOSKELETAL: Normal Range of Motion   	   SKIN: Normal  No Ecchymosis               LABS:  CBC Full  -  ( 11 Sep 2018 08:49 )  WBC Count : 5.95 K/uL  Hemoglobin : 11.5 g/dL  Hematocrit : 34.4 %  Platelet Count - Automated : 295 K/uL  Mean Cell Volume : 90.5 fl  Mean Cell Hemoglobin : 30.3 pg  Mean Cell Hemoglobin Concentration : 33.4 gm/dL  Auto Neutrophil # : x  Auto Lymphocyte # : x  Auto Monocyte # : x  Auto Eosinophil # : x  Auto Basophil # : x  Auto Neutrophil % : x  Auto Lymphocyte % : x  Auto Monocyte % : x  Auto Eosinophil % : x  Auto Basophil % : x    Urinalysis Basic - ( 10 Sep 2018 06:03 )    Color: Yellow / Appearance: Clear / S.010 / pH: x  Gluc: x / Ketone: Negative  / Bili: Negative / Urobili: Negative   Blood: x / Protein: Negative / Nitrite: Negative   Leuk Esterase: Negative / RBC: x / WBC x   Sq Epi: x / Non Sq Epi: x / Bacteria: x          144  |  108  |  11  ----------------------------<  85  4.1   |  29  |  0.82    Ca    8.6      11 Sep 2018 08:49    TPro  5.9<L>  /  Alb  2.8<L>  /  TBili  0.8  /  DBili  x   /  AST  16  /  ALT  17  /  AlkPhos  76  09-10    Hemoglobin A1C:     LIVER FUNCTIONS - ( 10 Sep 2018 07:15 )  Alb: 2.8 g/dL / Pro: 5.9 g/dL / ALK PHOS: 76 U/L / ALT: 17 U/L / AST: 16 U/L / GGT: x           Vitamin B12   PT/INR - ( 09 Sep 2018 13:31 )   PT: 11.4 sec;   INR: 1.04 ratio               RADIOLOGY    ANALYSIS AND PLAN:  This is a 63-year with episodes of forgetfulness.  1.	For episode of forgetfulness with occasional blank stare, questionable this could be any type of pseudodementia secondary to depression.  It appeared roughly 4 months ago when spouse started a new job, she started to become fatigued, lethargic, lazy, not eating, poor oral intake, possibly a pseudodementia secondary to depression.  Other differentials could be possibly subclinical seizure events versus possibly underlying migraine events.    2.	The patient is already on aspirin, Plavix, and a statin.    3.	MRI imaging of the brain was normal,   4.	I spoke with the patient and spouse in great detail that she should undergo further testing in regard to rule out subclinical seizure events, EEG, unable to do today no technician   5.	 It is unclear if they want to remain in the hospital.  If they do wish to leave, then the patient needs to follow up with her outside doctors to undergo EEG they understand this.  Based on their desire and willingness to stay in the hospital, we will plan the above mentioned tests, if not, the patient will follow up with her outside doctors.  If any new event, then definitely come back to the emergency room.  6.	spoke to spouse at bedside today patient is doing better overall today   7.	check EEG if normal cleared only by neurology     Thank you for the courtesy of consultation.    Physical therapy evaluation as tolerated  OOB to chair/ambulation with assistance only if possible.    Greater than 45 minutes spent in direct patient care reviewing  the notes, lab data/ imaging , discussion with multidisciplinary team.

## 2020-08-14 NOTE — ED ADULT NURSE NOTE - NSFALLRSKINDICATORS_ED_ALL_ED
"ALLOPURINOL 100MG TABS   FUROSEMIDE 20MG TABS   SERTRALINE HCL 50MG TABS   Requested Prescriptions   Pending Prescriptions Disp Refills     allopurinol (ZYLOPRIM) 100 MG tablet [Pharmacy Med Name: ALLOPURINOL 100MG TABS] 180 tablet 2     Sig: TAKE TWO TABLETS BY MOUTH ONCE DAILY       Gout Agents Protocol Failed - 8/14/2020 10:11 AM        Failed - Normal serum creatinine on file in the past 12 months     Recent Labs   Lab Test 08/06/20  1113   CR 1.34*       Ok to refill medication if creatinine is low          Passed - CBC on file in past 12 months     Recent Labs   Lab Test 08/06/20  1113   WBC 6.0   RBC 3.55*   HGB 9.7*   HCT 30.9*                    Passed - ALT on file in past 12 months     Recent Labs   Lab Test 08/06/20  1113   ALT 52*             Passed - Has Uric Acid on file in past 12 months and value is less than 6     Recent Labs   Lab Test 01/06/20  1233   URIC 2.8     If level is 6mg/dL or greater, ok to refill one time and refer to provider.           Passed - Recent (12 mo) or future (30 days) visit within the authorizing provider's specialty     Patient has had an office visit with the authorizing provider or a provider within the authorizing providers department within the previous 12 mos or has a future within next 30 days. See \"Patient Info\" tab in inbasket, or \"Choose Columns\" in Meds & Orders section of the refill encounter.              Passed - Medication is active on med list        Passed - Patient is age 18 or older        Passed - No active pregnancy on record        Passed - No positive pregnancy test in past year           furosemide (LASIX) 20 MG tablet [Pharmacy Med Name: FUROSEMIDE 20MG TABS] 90 tablet 2     Sig: TAKE ONE TABLET BY MOUTH ONCE DAILY       Diuretics (Including Combos) Protocol Failed - 8/14/2020 10:11 AM        Failed - Normal serum creatinine on file in past 12 months     Recent Labs   Lab Test 08/06/20  1113   CR 1.34*              Passed - Blood pressure " "under 140/90 in past 12 months     BP Readings from Last 3 Encounters:   08/06/20 132/72   08/03/20 (!) 172/72   07/27/20 131/58                 Passed - Recent (12 mo) or future (30 days) visit within the authorizing provider's specialty     Patient has had an office visit with the authorizing provider or a provider within the authorizing providers department within the previous 12 mos or has a future within next 30 days. See \"Patient Info\" tab in inbasket, or \"Choose Columns\" in Meds & Orders section of the refill encounter.              Passed - Medication is active on med list        Passed - Patient is age 18 or older        Passed - No active pregancy on record        Passed - Normal serum potassium on file in past 12 months     Recent Labs   Lab Test 08/06/20  1113   POTASSIUM 3.4                    Passed - Normal serum sodium on file in past 12 months     Recent Labs   Lab Test 08/06/20  1113                 Passed - No positive pregnancy test in past 12 months           sertraline (ZOLOFT) 50 MG tablet [Pharmacy Med Name: SERTRALINE HCL 50MG TABS] 90 tablet 0     Sig: TAKE ONE TABLET BY MOUTH ONCE DAILY       SSRIs Protocol Passed - 8/14/2020 10:11 AM        Passed - Recent (12 mo) or future (30 days) visit within the authorizing provider's specialty     Patient has had an office visit with the authorizing provider or a provider within the authorizing providers department within the previous 12 mos or has a future within next 30 days. See \"Patient Info\" tab in [employer], or \"Choose Columns\" in Meds & Orders section of the refill encounter.              Passed - Medication is active on med list        Passed - Patient is age 18 or older        Passed - No active pregnancy on record        Passed - No positive pregnancy test in last 12 months             " no

## 2024-04-15 NOTE — ED ADULT NURSE NOTE - NSIMPLEMENTINTERV_GEN_ALL_ED
Implemented All Universal Safety Interventions:  [employer] [place] to call system. Call bell, personal items and telephone within reach. Instruct patient to call for assistance. Room bathroom lighting operational. Non-slip footwear when patient is off stretcher. Physically safe environment: no spills, clutter or unnecessary equipment. Stretcher in lowest position, wheels locked, appropriate side rails in place. No